# Patient Record
Sex: MALE | URBAN - METROPOLITAN AREA
[De-identification: names, ages, dates, MRNs, and addresses within clinical notes are randomized per-mention and may not be internally consistent; named-entity substitution may affect disease eponyms.]

---

## 2022-06-23 ENCOUNTER — NEW PATIENT (OUTPATIENT)
Dept: URBAN - METROPOLITAN AREA CLINIC 48 | Facility: CLINIC | Age: 42
End: 2022-06-23

## 2022-06-23 DIAGNOSIS — H00.021: ICD-10-CM

## 2022-06-23 PROCEDURE — 99203 OFFICE O/P NEW LOW 30 MIN: CPT

## 2022-06-23 ASSESSMENT — TONOMETRY: OD_IOP_MMHG: 15

## 2022-06-23 ASSESSMENT — VISUAL ACUITY
OD_SC: 20/20-1
OS_SC: 20/20-1

## 2022-07-28 ENCOUNTER — ESTABLISHED COMPREHENSIVE EXAM (OUTPATIENT)
Dept: URBAN - METROPOLITAN AREA CLINIC 48 | Facility: CLINIC | Age: 42
End: 2022-07-28

## 2022-07-28 DIAGNOSIS — H34.232: ICD-10-CM

## 2022-07-28 PROCEDURE — 99214 OFFICE O/P EST MOD 30 MIN: CPT

## 2022-07-28 PROCEDURE — MISCOPTOS MISCELLANEOUS, OPTOS

## 2022-07-28 ASSESSMENT — TONOMETRY
OD_IOP_MMHG: 13
OS_IOP_MMHG: 13

## 2022-07-28 ASSESSMENT — VISUAL ACUITY
OD_SC: J1+
OS_SC: J1+
OS_SC: 20/20
OD_SC: 20/20

## 2022-08-31 ENCOUNTER — CONSULT EP (OUTPATIENT)
Dept: URBAN - METROPOLITAN AREA CLINIC 48 | Facility: CLINIC | Age: 42
End: 2022-08-31

## 2022-08-31 DIAGNOSIS — H01.02B: ICD-10-CM

## 2022-08-31 DIAGNOSIS — H01.02A: ICD-10-CM

## 2022-08-31 DIAGNOSIS — H00.14: ICD-10-CM

## 2022-08-31 PROCEDURE — 99213 OFFICE O/P EST LOW 20 MIN: CPT

## 2022-08-31 ASSESSMENT — VISUAL ACUITY
OD_SC: 20/20
OS_SC: 20/20

## 2022-10-26 ENCOUNTER — FOLLOW UP (OUTPATIENT)
Dept: URBAN - METROPOLITAN AREA CLINIC 48 | Facility: CLINIC | Age: 42
End: 2022-10-26

## 2022-10-26 DIAGNOSIS — H01.02A: ICD-10-CM

## 2022-10-26 DIAGNOSIS — H01.02B: ICD-10-CM

## 2022-10-26 DIAGNOSIS — H00.14: ICD-10-CM

## 2022-10-26 PROCEDURE — 99213 OFFICE O/P EST LOW 20 MIN: CPT

## 2022-10-26 ASSESSMENT — VISUAL ACUITY
OS_SC: 20/20
OD_SC: 20/20

## 2023-07-24 ENCOUNTER — PROBLEM (OUTPATIENT)
Dept: URBAN - METROPOLITAN AREA CLINIC 48 | Facility: CLINIC | Age: 43
End: 2023-07-24

## 2023-07-24 DIAGNOSIS — H01.023: ICD-10-CM

## 2023-07-24 DIAGNOSIS — H01.026: ICD-10-CM

## 2023-07-24 PROCEDURE — 92012 INTRM OPH EXAM EST PATIENT: CPT

## 2023-07-24 ASSESSMENT — TONOMETRY
OS_IOP_MMHG: 10
OD_IOP_MMHG: 10

## 2023-07-24 ASSESSMENT — VISUAL ACUITY
OD_SC: 20/20
OS_SC: 20/20

## 2024-02-03 ENCOUNTER — LAB REQUISITION (OUTPATIENT)
Dept: LAB | Facility: HOSPITAL | Age: 44
End: 2024-02-03

## 2024-02-03 DIAGNOSIS — R30.0 DYSURIA: ICD-10-CM

## 2024-02-03 DIAGNOSIS — M54.50 LOW BACK PAIN, UNSPECIFIED: ICD-10-CM

## 2024-02-03 PROCEDURE — 87086 URINE CULTURE/COLONY COUNT: CPT

## 2024-02-03 PROCEDURE — 87661 TRICHOMONAS VAGINALIS AMPLIF: CPT

## 2024-02-03 PROCEDURE — 87800 DETECT AGNT MULT DNA DIREC: CPT

## 2024-02-04 LAB
BACTERIA UR CULT: NO GROWTH
C TRACH RRNA SPEC QL NAA+PROBE: NEGATIVE
N GONORRHOEA DNA SPEC QL PROBE+SIG AMP: NEGATIVE

## 2024-02-07 LAB — T VAGINALIS RRNA SPEC QL NAA+PROBE: NEGATIVE

## 2025-01-15 ENCOUNTER — OFFICE VISIT (OUTPATIENT)
Dept: URGENT CARE | Age: 45
End: 2025-01-15

## 2025-01-15 VITALS
OXYGEN SATURATION: 98 % | SYSTOLIC BLOOD PRESSURE: 129 MMHG | TEMPERATURE: 99.7 F | RESPIRATION RATE: 20 BRPM | DIASTOLIC BLOOD PRESSURE: 84 MMHG | HEART RATE: 92 BPM

## 2025-01-15 DIAGNOSIS — R50.9 FEVER, UNSPECIFIED FEVER CAUSE: ICD-10-CM

## 2025-01-15 LAB
POC RAPID INFLUENZA A: NEGATIVE
POC RAPID INFLUENZA B: NEGATIVE
POC SARS-COV-2 AG BINAX: NORMAL

## 2025-01-15 PROCEDURE — 87804 INFLUENZA ASSAY W/OPTIC: CPT | Performed by: PHYSICIAN ASSISTANT

## 2025-01-15 PROCEDURE — 87811 SARS-COV-2 COVID19 W/OPTIC: CPT | Performed by: PHYSICIAN ASSISTANT

## 2025-01-15 PROCEDURE — 99203 OFFICE O/P NEW LOW 30 MIN: CPT | Performed by: PHYSICIAN ASSISTANT

## 2025-01-15 NOTE — PROGRESS NOTES
Subjective   Patient ID: Andrea Vargas is a 45 y.o. male. They present today with a chief complaint of BODYACHES (STARTED SUNDAY WITH cough and sore throat).    CC: URI symptoms    HPI: Patient presenting for URI symptoms x 3-4 days.  Symptoms include runny nose, congestion, and cough.      No chest pain, shortness of breath, abdominal pain, nausea, vomiting.  No fever, chills, myalgias.  No history of clots or clotting disorders.  No lower extremity swelling or pain.    Past Medical History  Allergies as of 01/15/2025    (No Known Allergies)       (Not in a hospital admission)       No past medical history on file.    No past surgical history on file.         Review of Systems  Review of Systems    After reviewing all body systems I have documented pertinent findings above in the history.  All other Systems reviewed and are negative for complaint.  Pertinent positive and negatives are listed in the above HPI.    Objective    Vitals:    01/15/25 0815   BP: 129/84   Pulse: 92   Resp: 20   Temp: 37.6 °C (99.7 °F)   SpO2: 98%     No LMP for male patient.  Physical Exam    General: Alert, oriented, and cooperative.  No acute distress. Well developed, well nourished.     Skin: Skin is warm, and dry. No rashes or lesions.    Eyes: Sclera and conjunctivae normal     Ears: TM´s are intact, grey, with mild effusions bilaterally.  No significant erythema.  No hemotympanum or rupture. Bilateral auricle, helix, and tragus without inflammation or erythema.  No mastoid erythema, or tenderness.  No deformities. Right and left canal negative for erythema, or discharge.  Hearing is grossly intact bilaterally    Mouth/Throat: Pharynx is erythematous.  Tonsils are equal in size.  No exudates.  No angioedema of the lips or tongue.  No respiratory compromise, tripoding, drooling, muffled voice,  stridor, or trismus.     Neck: Supple.     Cardiac: Regular rate and rhythm    Respiratory:  No acute respiratory distress.  Regular rate of  breathing.  No accessory muscle use.  No tripoding.  Lungs are clear bilaterally.    Abdomen: Soft, nontender.    Musculoskeletal: Upper and lower extremities are atraumatic in appearance without deformity, erythema, edema, and atrophy. No crepitus, or tenderness. Compartments are all soft.      Procedures    Point of Care Test & Imaging Results from this visit  Results for orders placed or performed in visit on 01/15/25   POCT Covid-19 Rapid Antigen    Collection Time: 01/15/25  8:30 AM   Result Value Ref Range    POC YO-COV-2 AG  Presumptive negative test for SARS-CoV-2 (no antigen detected)     Presumptive negative test for SARS-CoV-2 (no antigen detected)   POCT Influenza A/B manually resulted    Collection Time: 01/15/25  8:30 AM   Result Value Ref Range    POC Rapid Influenza A Negative Negative    POC Rapid Influenza B Negative Negative     No results found.    Diagnostic study results (if any) were reviewed by Dread Norwood PA-C.    Assessment/Plan   Allergies, medications, history, and pertinent labs/EKGs/Imaging reviewed by Dread Norwood PA-C.     MDM:  Patient presenting for URI type symptoms x 4 days. Patient does not appear toxic. No acute distress or respiratory compromise.  No tripoding. No nasal flaring.  Speaks in complete sentences.  No sinus tenderness, oral lesions, drooling, stridor, or angioedema. Neck is supple without meningeal signs. Lungs clear.  No reported chest tightness or purulent sputum production.  No clinical signs or history to suggest meningitis, Jay´s, peritonsillar abscess, epiglottitis, pneumonia, or asthma.  Given symptom onset/length of illness, a viral etiology is considered the most likely cause. Through shared decision making antibiotics are not warranted, and were not prescribed. Advised over the counter medication with good follow up. Pt discharged home d/t good condition.  Pt/family instructed to return if symptoms worsen or if new symptoms develop.  Patient/family expressed understanding and consented to the above plan. No barriers of communication were apparent.    Orders and Diagnoses  Diagnoses and all orders for this visit:  Fever, unspecified fever cause  -     POCT Covid-19 Rapid Antigen  -     POCT Influenza A/B manually resulted      Medical Admin Record      Patient disposition: Home    Electronically signed by Dread Norwood PA-C  8:49 AM

## 2025-01-15 NOTE — PATIENT INSTRUCTIONS
You were seen for cold like symptoms.  You most likely have a viral upper respiratory tract infection.  Antibiotics are not needed at this time.    I recommend supportive therapy with the following medications below.    URI  1.  Please take Tylenol every 6 hours as needed for fever.  Alternate with ibuprofen every 6 hours.  2.  Use Tea and honey for cough. This is known to work better than most OTC medications.  3.  You can use Mucinex to break up congestion.  If prescribed use Pseudoephedrine-bromphen for cough. -This is an antihistamine, cough suppressant, and decongestant. It can help relieve cough, runny nose, stuffy nose, sneezing, and itchy or watery eyes.  4.  Stay well-hydrated and drink lots of fluids.  5.  Follow up with your primary care physician.  6.  Return to ED if symptoms worsen or new symptoms develop.    Based on clinical exam findings and history you most likely have a upper respiratory tract infection.  You are contagious as soon as the symptoms start.  Your symptoms may persist up to 2-3 weeks.  Symptoms usually peak by days 3 or 5.  The virus can be shed by hand-to-hand contact, nose and eye contact. Antibiotics are not necessary and do not help treat viral respiratory tract infections. Common respiratory tract infections include the common cold. Typical symptoms include nasal congestion, a runny nose, scratchy throat, cough, and irritability. The diagnosis is based on symptoms. Good hand hygiene is the best way to prevent these infections, and routine vaccination can help prevent influenza. Treatment aims to relieve symptoms. Rest and fluids. Tylenol and/or ibuprofen for fever and pain. Over the counter decongestants or mucolytics. Antibiotics are not necessary and do not help treat viral respiratory tract infections.

## (undated) RX ORDER — TOBRAMYCIN AND DEXAMETHASONE 3; 1 MG/G; MG/G
1/2 OINTMENT OPHTHALMIC TWICE A DAY
Start: 2023-07-24

## (undated) RX ORDER — CEPHALEXIN 500 MG/1
1 CAPSULE ORAL TWICE A DAY
End: 2022-07-03

## (undated) RX ORDER — ERYTHROMYCIN 5 MG/G: 1/4 OINTMENT OPHTHALMIC EVERY EVENING

## (undated) RX ORDER — DOXYCYCLINE HYCLATE 100MG 100 MG/1
1 CAPSULE ORAL ONCE A DAY
Start: 2022-08-31